# Patient Record
Sex: FEMALE | Race: OTHER
[De-identification: names, ages, dates, MRNs, and addresses within clinical notes are randomized per-mention and may not be internally consistent; named-entity substitution may affect disease eponyms.]

---

## 2020-09-21 ENCOUNTER — HOSPITAL ENCOUNTER (EMERGENCY)
Dept: HOSPITAL 61 - ER | Age: 35
Discharge: HOME | End: 2020-09-21
Payer: COMMERCIAL

## 2020-09-21 VITALS — BODY MASS INDEX: 34.6 KG/M2 | HEIGHT: 67 IN | WEIGHT: 220.46 LBS

## 2020-09-21 VITALS
SYSTOLIC BLOOD PRESSURE: 134 MMHG | SYSTOLIC BLOOD PRESSURE: 134 MMHG | DIASTOLIC BLOOD PRESSURE: 61 MMHG | DIASTOLIC BLOOD PRESSURE: 61 MMHG

## 2020-09-21 DIAGNOSIS — R10.30: Primary | ICD-10-CM

## 2020-09-21 DIAGNOSIS — R11.2: ICD-10-CM

## 2020-09-21 DIAGNOSIS — K57.30: ICD-10-CM

## 2020-09-21 LAB
ALBUMIN SERPL-MCNC: 3.5 G/DL (ref 3.4–5)
ALBUMIN/GLOB SERPL: 0.9 {RATIO} (ref 1–1.7)
ALP SERPL-CCNC: 68 U/L (ref 46–116)
ALT SERPL-CCNC: 28 U/L (ref 14–59)
ANION GAP SERPL CALC-SCNC: 9 MMOL/L (ref 6–14)
APTT PPP: YELLOW S
AST SERPL-CCNC: 38 U/L (ref 15–37)
BACTERIA #/AREA URNS HPF: (no result) /HPF
BASOPHILS # BLD AUTO: 0.1 X10^3/UL (ref 0–0.2)
BASOPHILS NFR BLD: 1 % (ref 0–3)
BILIRUB SERPL-MCNC: 0.3 MG/DL (ref 0.2–1)
BILIRUB UR QL STRIP: NEGATIVE
BUN SERPL-MCNC: 16 MG/DL (ref 7–20)
BUN/CREAT SERPL: 40 (ref 6–20)
CALCIUM SERPL-MCNC: 8.9 MG/DL (ref 8.5–10.1)
CHLORIDE SERPL-SCNC: 104 MMOL/L (ref 98–107)
CO2 SERPL-SCNC: 26 MMOL/L (ref 21–32)
CREAT SERPL-MCNC: 0.4 MG/DL (ref 0.6–1)
EOSINOPHIL NFR BLD: 0.1 X10^3/UL (ref 0–0.7)
EOSINOPHIL NFR BLD: 1 % (ref 0–3)
ERYTHROCYTE [DISTWIDTH] IN BLOOD BY AUTOMATED COUNT: 14.5 % (ref 11.5–14.5)
FIBRINOGEN PPP-MCNC: CLEAR MG/DL
GFR SERPLBLD BASED ON 1.73 SQ M-ARVRAT: 183.8 ML/MIN
GLOBULIN SER-MCNC: 4.1 G/DL (ref 2.2–3.8)
GLUCOSE SERPL-MCNC: 101 MG/DL (ref 70–99)
HCT VFR BLD CALC: 42.7 % (ref 36–47)
HGB BLD-MCNC: 14.3 G/DL (ref 12–15.5)
LIPASE: 102 U/L (ref 73–393)
LYMPHOCYTES # BLD: 2.4 X10^3/UL (ref 1–4.8)
LYMPHOCYTES NFR BLD AUTO: 22 % (ref 24–48)
MCH RBC QN AUTO: 29 PG (ref 25–35)
MCHC RBC AUTO-ENTMCNC: 33 G/DL (ref 31–37)
MCV RBC AUTO: 86 FL (ref 79–100)
MONO #: 1 X10^3/UL (ref 0–1.1)
MONOCYTES NFR BLD: 10 % (ref 0–9)
NEUT #: 7.4 X10^3/UL (ref 1.8–7.7)
NEUTROPHILS NFR BLD AUTO: 67 % (ref 31–73)
NITRITE UR QL STRIP: NEGATIVE
PH UR STRIP: 6.5 [PH]
PLATELET # BLD AUTO: 196 X10^3/UL (ref 140–400)
POTASSIUM SERPL-SCNC: 4.5 MMOL/L (ref 3.5–5.1)
PROT SERPL-MCNC: 7.6 G/DL (ref 6.4–8.2)
PROT UR STRIP-MCNC: NEGATIVE MG/DL
RBC # BLD AUTO: 4.95 X10^6/UL (ref 3.5–5.4)
RBC #/AREA URNS HPF: (no result) /HPF (ref 0–2)
SODIUM SERPL-SCNC: 139 MMOL/L (ref 136–145)
SQUAMOUS #/AREA URNS LPF: (no result) /LPF
UROBILINOGEN UR-MCNC: 1 MG/DL
WBC # BLD AUTO: 11 X10^3/UL (ref 4–11)
WBC #/AREA URNS HPF: (no result) /HPF (ref 0–4)

## 2020-09-21 PROCEDURE — 36415 COLL VENOUS BLD VENIPUNCTURE: CPT

## 2020-09-21 PROCEDURE — 74177 CT ABD & PELVIS W/CONTRAST: CPT

## 2020-09-21 PROCEDURE — 81025 URINE PREGNANCY TEST: CPT

## 2020-09-21 PROCEDURE — 96360 HYDRATION IV INFUSION INIT: CPT

## 2020-09-21 PROCEDURE — 85025 COMPLETE CBC W/AUTO DIFF WBC: CPT

## 2020-09-21 PROCEDURE — 81001 URINALYSIS AUTO W/SCOPE: CPT

## 2020-09-21 PROCEDURE — 99285 EMERGENCY DEPT VISIT HI MDM: CPT

## 2020-09-21 PROCEDURE — 83690 ASSAY OF LIPASE: CPT

## 2020-09-21 PROCEDURE — 80053 COMPREHEN METABOLIC PANEL: CPT

## 2020-09-21 PROCEDURE — 87086 URINE CULTURE/COLONY COUNT: CPT

## 2020-09-21 NOTE — PHYS DOC
Past Medical History


Past Medical History:  No Pertinent History


Past Surgical History:  No Surgical History


Smoking Status:  Never Smoker


Alcohol Use:  Occasionally


Drug Use:  None





General Adult


EDM:


Chief Complaint:  ABDOMINAL PAIN





HPI:


HPI:





Patient is a 35  year old female who presented to ER today for evaluation of 

nausea vomiting and abdominal pain for the last 4 days.  Patient states she 

vomits so much over the weekend that she did not want to eat anymore.  Patient 

denies any fever, no cough, no trouble breathing.  Patient has history of 

ventral hernia.





Review of Systems:


Review of Systems:


Constitutional:   Denies fever or chills. []


Eyes:   Denies change in visual acuity. []


HENT:   Denies nasal congestion or sore throat. [] 


Respiratory:   Denies cough or shortness of breath. [] 


Cardiovascular:   Denies chest pain or edema. [] 


GI:   Positive for nausea vomiting and abdominal pain, no diarrhea.


:  Denies dysuria. [] 


Musculoskeletal:   Denies back pain or joint pain. [] 


Integument:   Denies rash. [] 


Neurologic:   Denies headache, focal weakness or sensory changes. [] 


Endocrine:   Denies polyuria or polydipsia. [] 


Lymphatic:  Denies swollen glands. [] 


Psychiatric:  Denies depression or anxiety. []





Heart Score:


Risk Factors:


Risk Factors:  DM, Current or recent (<one month) smoker, HTN, HLP, family 

history of CAD, obesity.


Risk Scores:


Score 0 - 3:  2.5% MACE over next 6 weeks - Discharge Home


Score 4 - 6:  20.3% MACE over next 6 weeks - Admit for Clinical Observation


Score 7 - 10:  72.7% MACE over next 6 weeks - Early Invasive Strategies





Current Medications:





Current Medications








 Medications


  (Trade)  Dose


 Ordered  Sig/[facility]  Start Time


 Stop Time Status Last Admin


Dose Admin


 


 Sodium Chloride  1,000 ml @ 


 1,000 mls/hr  1X  ONCE  20 15:15


 20 16:14   














Allergies:


Allergies:





Allergies








Coded Allergies Type Severity Reaction Last Updated Verified


 


  No Known Drug Allergies    10/28/15 No











Physical Exam:


PE:





Constitutional: Well developed, well nourished, no acute distress, non-toxic 

appearance. []


HENT: Normocephalic, atraumatic, bilateral external ears normal, oropharynx 

moist, no oral exudates, nose normal. []


Eyes: PERRLA, EOMI, conjunctiva normal, no discharge. [] 


Neck: Normal range of motion, no tenderness, supple, no stridor. [] 


Cardiovascular:Heart rate regular rhythm, no murmur []


Lungs & Thorax:  Bilateral breath sounds clear to auscultation []


Abdomen: Bowel sounds normal, soft, THERE IS  tenderness TO PALPATION, NO 

HERNIA, no masses, no pulsatile masses. [] 


Skin: Warm, dry, no erythema, no rash. [] 


Back: No tenderness, no CVA tenderness. [] 


Extremities: No tenderness, no cyanosis, no clubbing, ROM intact, no edema. [] 


Neurologic: Alert and oriented X 3, normal motor function, normal sensory 

function, no focal deficits noted. []


Psychologic: Affect normal, judgement normal, mood normal. []





Current Patient Data:


Labs:





                                Laboratory Tests








Test


 20


13:56 20


14:11


 


Urine Collection Type Unknown   


 


Urine Color Yellow   


 


Urine Clarity Clear   


 


Urine pH


 6.5 (<5.0-8.0)


 





 


Urine Specific Gravity


 1.025


(1.000-1.030) 





 


Urine Protein


 Negative mg/dL


(NEG-TRACE) 





 


Urine Glucose (UA)


 Negative mg/dL


(NEG) 





 


Urine Ketones (Stick)


 Negative mg/dL


(NEG) 





 


Urine Blood


 Negative (NEG)


 





 


Urine Nitrite


 Negative (NEG)


 





 


Urine Bilirubin


 Negative (NEG)


 





 


Urine Urobilinogen Dipstick


 1.0 mg/dL (0.2


mg/dL) 





 


Urine Leukocyte Esterase


 Negative (NEG)


 





 


Urine RBC


 Occ /HPF (0-2)


 





 


Urine WBC


 5-10 /HPF


(0-4) 





 


Urine Squamous Epithelial


Cells Few /LPF  


 





 


Urine Bacteria


 Few /HPF


(0-FEW) 





 


Urine Mucus Slight /LPF   


 


POC Urine HCG, Qualitative


 


 Hcg negative


(Negative)








Vital Signs:





                                   Vital Signs








  Date Time  Temp Pulse Resp B/P (MAP) Pulse Ox O2 Delivery O2 Flow Rate FiO2


 


20 13:09 98.7 73 16 135/76 (95) 94 Room Air  





 98.7       











EKG:


EKG:


[]





Radiology/Procedures:


Radiology/Procedures:


[][facility]


                    [address]  [place], [place] [id number] [phone]


                                        


                                 IMAGING REPORT





                                     Signed





PATIENT: TYRA SANTOS  ACCOUNT: [id number]     MRN#: [id number]


: 1985           LOCATION: ER              AGE: 35


SEX: F                    EXAM DT: 20         ACCESSION#: [id number]


STATUS: REG ER            ORD. PHYSICIAN: ISSA VIEIRA DO


REASON: ABDOMINAL PAIN, NAUSEA, VOMITING


PROCEDURE: CT ABD PELV W/ IV CONTRST ONLY





PQRS Compliance Statement:


 


One or more of the following individualized dose reduction techniques were


utilized for this examination:  


1. Automated exposure control  


2. Adjustment of the mA and/or kV according to patient size  


3. Use of iterative reconstruction technique


 


 


CT ABD PELV W/ IV CONTRST ONLY


 


Clinical Indication: Reason: ABDOMINAL PAIN, NAUSEA, VOMITING 


 


Comparison: None.


 


Technique: Helical CT imaging of the abdomen and pelvis is performed after


75 cc of Omnipaque 300 IV contrast. Oral contrast not administered.


 


Findings: 


There is minimal atelectasis or scarring in the periphery of the posterior


left lower lobe. Lung bases otherwise clear. Cardiac size normal.


 


Gallbladder is contracted, limiting evaluation. There is a moderate sized 


fat-containing supraumbilical hernia. There is associated ventral hernia 


mesh.


 


The liver is homogeneous. Probable Riedel lobe. The spleen, pancreas, 


adrenal glands, abdominal aorta, and kidneys are normal.


 


No obvious abnormality of the stomach. There is no dilated small bowel. 


There is mild descending colon diverticulosis. No colon wall thickening is


identified. 2 probable pills are seen in the colon. Appendix is not seen, 


no secondary signs of appendicitis. There is no abdominal adenopathy or 


free fluid.


 


There is mild wall thickening of the urinary bladder. Retroverted uterus. 


Question small functional cyst of the left ovary. No pelvic free fluid is 


seen. Left inguinal tortuous collateral vessels are seen.


 


Small Schmorl's node inferior endplate of L4.


 


IMPRESSION:


1.  There is mild wall thickening of the urinary bladder. Considerations 


include nonspecific cystitis or chronic bladder outlet obstruction.


2.  Mild descending colon diverticulosis.


 


Electronically signed by: Ryan Miguel MD (2020 5:08 PM) [facility]














DICTATED and SIGNED BY:     RYAN MIGUEL MD


DATE:     20





Course & Med Decision Making:


Course & Med Decision Making


Pertinent Labs and Imaging studies reviewed. (See chart for details)





[]





Dragon Disclaimer:


Dragon Disclaimer:


This electronic medical record was generated, in whole or in part, using a voice

 recognition dictation system.





Departure


Departure


Impression:  


   Primary Impression:  


   Abdominal pain


   Additional Impression:  


   Cystitis


Disposition:   HOME, SELF-CARE


Condition:  IMPROVED


Referrals:  


NO PCP (PCP)


follow up with your doctor as needed


Patient Instructions:  Abdominal Pain, Urinary Tract Infection





Additional Instructions:  


Thank you for visiting our Emergency Department. We appreciate you trusting us 

with your care. If any additional problems come up don't hesitate to return to 

visit us. Please follow up with your primary care provider so they can plan 

additional care if needed and know about the problem that you had. If symptoms 

worsen come back to the Emergency Department. Any concerning symptoms that start

 such as chest pain, shortness of air, weakness or numbness on one side of the 

body, running high fevers or any other concerning symptoms return to the ER.


Scripts


Tramadol Hcl (TRAMADOL HCL) 50 Mg Tablet


50 MG PO Q6HRS PRN for PAIN, #15 TAB


   Prov: ISSA VIEIRA DO         20 


Ciprofloxacin Hcl (CIPRO) 500 Mg Tablet


1 TAB PO BID for 7 Days, #14 TAB 0 Refills


   Prov: ISSA VIEIRA DO         20





Justicifation of Admission Dx:


Justifications for Admission:


Justification of Admission Dx:  N/A











ISSA VIEIRA DO                Sep 21, 2020 15:09 Wound healing  Range of motion

## 2020-09-21 NOTE — RAD
PQRS Compliance Statement:

 

One or more of the following individualized dose reduction techniques were

utilized for this examination:  

1. Automated exposure control  

2. Adjustment of the mA and/or kV according to patient size  

3. Use of iterative reconstruction technique

 

 

CT ABD PELV W/ IV CONTRST ONLY

 

Clinical Indication: Reason: ABDOMINAL PAIN, NAUSEA, VOMITING 

 

Comparison: None.

 

Technique: Helical CT imaging of the abdomen and pelvis is performed after

75 cc of Omnipaque 300 IV contrast. Oral contrast not administered.

 

Findings: 

There is minimal atelectasis or scarring in the periphery of the posterior

left lower lobe. Lung bases otherwise clear. Cardiac size normal.

 

Gallbladder is contracted, limiting evaluation. There is a moderate sized 

fat-containing supraumbilical hernia. There is associated ventral hernia 

mesh.

 

The liver is homogeneous. Probable Riedel lobe. The spleen, pancreas, 

adrenal glands, abdominal aorta, and kidneys are normal.

 

No obvious abnormality of the stomach. There is no dilated small bowel. 

There is mild descending colon diverticulosis. No colon wall thickening is

identified. 2 probable pills are seen in the colon. Appendix is not seen, 

no secondary signs of appendicitis. There is no abdominal adenopathy or 

free fluid.

 

There is mild wall thickening of the urinary bladder. Retroverted uterus. 

Question small functional cyst of the left ovary. No pelvic free fluid is 

seen. Left inguinal tortuous collateral vessels are seen.

 

Small Schmorl's node inferior endplate of L4.

 

IMPRESSION:

1.  There is mild wall thickening of the urinary bladder. Considerations 

include nonspecific cystitis or chronic bladder outlet obstruction.

2.  Mild descending colon diverticulosis.

 

Electronically signed by: Ryan Miguel MD (9/21/2020 5:08 PM) [facility]

## 2021-03-05 ENCOUNTER — HOSPITAL ENCOUNTER (OUTPATIENT)
Dept: HOSPITAL 61 - KCIC | Age: 36
End: 2021-03-05
Attending: FAMILY MEDICINE
Payer: COMMERCIAL

## 2021-03-05 DIAGNOSIS — Z11.1: Primary | ICD-10-CM

## 2021-03-05 PROCEDURE — 71045 X-RAY EXAM CHEST 1 VIEW: CPT

## 2021-03-05 NOTE — KCIC
XR CHEST 1V 



INDICATION: TB follow up  . 



COMPARISON STUDY: None.



FINDINGS:



Lungs: Normal lung volume. No pulmonary mass or consolidation. The tracheobronchial tree and hilar st
ructures are normal.



Pleura: No pleural effusion or pneumothorax.



Heart and Mediastinum: The cardiomediastinal silhouette is normal. The great vessels of the thorax ar
e normal.



Bones and Soft Tissues: The bones and soft tissues are within normal limits.



IMPRESSION:  

No acute cardiopulmonary process.



Electronically signed by: Miguel Barriga MD (3/5/2021 2:38 PM) [id number]